# Patient Record
Sex: FEMALE | Race: WHITE | NOT HISPANIC OR LATINO | URBAN - METROPOLITAN AREA
[De-identification: names, ages, dates, MRNs, and addresses within clinical notes are randomized per-mention and may not be internally consistent; named-entity substitution may affect disease eponyms.]

---

## 2017-02-15 ENCOUNTER — IMPORTED ENCOUNTER (OUTPATIENT)
Dept: URBAN - METROPOLITAN AREA CLINIC 50 | Facility: CLINIC | Age: 77
End: 2017-02-15

## 2017-02-16 ENCOUNTER — IMPORTED ENCOUNTER (OUTPATIENT)
Dept: URBAN - METROPOLITAN AREA CLINIC 50 | Facility: CLINIC | Age: 77
End: 2017-02-16

## 2017-02-16 NOTE — PATIENT DISCUSSION
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 Call if vision decreases or distortion increases. Recommend regular Amsler checks.  """

## 2017-02-23 ENCOUNTER — IMPORTED ENCOUNTER (OUTPATIENT)
Dept: URBAN - METROPOLITAN AREA CLINIC 50 | Facility: CLINIC | Age: 77
End: 2017-02-23

## 2017-03-09 ENCOUNTER — IMPORTED ENCOUNTER (OUTPATIENT)
Dept: URBAN - METROPOLITAN AREA CLINIC 50 | Facility: CLINIC | Age: 77
End: 2017-03-09

## 2017-05-09 NOTE — PATIENT DISCUSSION
+Family History of AMD Counseling:  I have instructed the patient to eat a healthy diet with fish and green leafy vegetables, not to smoke, to wear sunglasses while outdoors, and take a multivitamin / AREDS 2 formula vitamin to minimize the risk of disease. The patient was advised to call the office if new symptoms of distortion of vision arise. Return for follow-up as scheduled.

## 2017-05-09 NOTE — PATIENT DISCUSSION
POSTERIOR VITREOUS DETACHMENT WITH VITREOUS FLOATERS, OD: NO HOLES OR NO TEARS 360' WITH INDIRECT EXAM, OU. F&amp;F PRECAUTIONS REVIEWED WITH THE PATIENT. RETURN  AS SCHEDULED.

## 2018-04-10 ENCOUNTER — IMPORTED ENCOUNTER (OUTPATIENT)
Dept: URBAN - METROPOLITAN AREA CLINIC 50 | Facility: CLINIC | Age: 78
End: 2018-04-10

## 2018-07-23 NOTE — PATIENT DISCUSSION
OCULAR ROSACEA, OU:  PRESCRIBE GOOD QUALITY ARTIFICIAL TEARS BID-QID. RECOMMEND THE DAILY INTAKE OF OMEGA-3 FATTY ACIDS WITH PRIMARY CARE PHYSICIANS APPROVAL. WILL CONSIDER WARM COMPRESSES,  EYELID SCRUBS QD-BID, AND  TOPICAL STEROID, ORAL TETRACYCLINE AND/OR LIPIFLOW FOR EXACERBATIONS IF SYMPTOMS WORSEN. RETURN FOR FOLLOW-UP AS SCHEDULED.

## 2018-07-23 NOTE — PATIENT DISCUSSION
Continue: Fish Oil (omega 3-dha-epa-fish oil): capsule: 60- mg I personally performed the service described in the documentation recorded by the scribe in my presence, and it accurately and completely records my words and actions.

## 2018-11-16 NOTE — PATIENT DISCUSSION
Burrell Visual Field 36 point screen: I have reviewed the visual fields both taped and untaped on this patient which demonstrate significant obstruction of the patient's peripheral visual field on both eyes.

## 2018-11-16 NOTE — PATIENT DISCUSSION
PHOTOGRAPHS: I have reviewed the external ocular photographs of this patient which show the following: significant dermatochalasis and ptosis of both upper eyelids.

## 2019-06-19 NOTE — PATIENT DISCUSSION
PHOTOGRAPHS: I have reviewed the external ocular photographs of this patient which show the following: significant ptosis and dermatochalasis of both upper eyelids.

## 2019-06-25 ENCOUNTER — IMPORTED ENCOUNTER (OUTPATIENT)
Dept: URBAN - METROPOLITAN AREA CLINIC 50 | Facility: CLINIC | Age: 79
End: 2019-06-25

## 2019-06-25 NOTE — PATIENT DISCUSSION
"""Long discussion on dry eye. Explained to patient that dry eye is not due to cataract surgery.  ""

## 2019-07-25 NOTE — PATIENT DISCUSSION
*CATARACTS, OU - SLOWLY BECOMING VISUALLY SIGNIFICANT BUT NOT BOTHERSOME TO PATIENT AT THIS TIME. SPOKE IN GREAT DETAIL WITH PATIENT ABOUT CATARACT SURGERY. SPEC RX OFFERED. FOLLOW AS SCHEDULED.

## 2019-07-25 NOTE — PATIENT DISCUSSION
DERMATOCHALASIS / PTOSIS RUL AND MARY :  VISUALLY SIGNIFICANT TO PATIENT. SCHEDULE WITH OCULOPLASTIC SPECIALIST IF PATIENT DESIRES.

## 2019-08-09 NOTE — PATIENT DISCUSSION
Also, please do not hesitate to call us if you have any concerns not addressed by this information. Please call [phone] and we will do everything we can to help you during this period.

## 2020-06-30 ENCOUNTER — IMPORTED ENCOUNTER (OUTPATIENT)
Dept: URBAN - METROPOLITAN AREA CLINIC 50 | Facility: CLINIC | Age: 80
End: 2020-06-30

## 2020-06-30 NOTE — PATIENT DISCUSSION
"""Discussion on dry eye and the importance of lubricating.  Explained that irritation is secondary to ""

## 2020-10-29 NOTE — PATIENT DISCUSSION
CATARACT, OU - VISUALLY SIGNIFICANT. DISCUSSED PROCEDURE IN DETAIL. SCHEDULE PHACO WITH IOL OS FIRST THEN OD IF VISUAL SYMPTOMS PERSIST. GLASSES RX GIVEN TO FILL IF DESIRES IN THE EVENT PATIENT DOES NOT PROCEED WITH SURGERY.

## 2020-10-29 NOTE — PATIENT DISCUSSION
Surgery Counseling:  I have discussed the option of glasses versus cataract surgery versus following, It was explained that when vision no longer meets the patient's visual needs and a new prescription for glasses is not likely to improve the patient's visual symptoms, the option of cataract surgery is a reasonable next step. It was explained that there is no guarantee that removing the cataract will improve their visual symptoms; however, it is believed that the cataract is contributing to the patient's visual impairment and surgery may noticeably improve both the visual and functional status of the patient. After this discussion, the patient desires to proceed with cataract surgery with implantation of an intraocular lens to improve their vision for driving, watching TV, and reading.

## 2020-10-29 NOTE — PATIENT DISCUSSION
OCULAR ROSACEA, OU:  CONTINUE WARM COMPRESSES,  EYELID SCRUBS QD-BID, AND GOOD QUALITY ARTIFICIAL TEARS BID-QID. RECOMMEND THE DAILY INTAKE OF OMEGA-3 FATTY ACIDS WITH PRIMARY CARE PHYSICIANS APPROVAL. WILL CONSIDER TOPICAL STEROID, ORAL TETRACYCLINE AND/OR LIPIFLOW FOR EXACERBATIONS IF SYMPTOMS WORSEN. RETURN FOR FOLLOW-UP AS SCHEDULED.

## 2020-11-17 NOTE — PATIENT DISCUSSION
New Prescription: moxifloxacin (moxifloxacin): drops: 0.5% 1 drop three times a day as directed into left eye 11-

## 2020-11-17 NOTE — PATIENT DISCUSSION
New Prescription: Pred Forte (prednisolone acetate): drops,suspension: 1% 1 drop three times a day as directed into left eye 11-

## 2020-12-02 NOTE — PATIENT DISCUSSION
S/P PC IOL, OS. STABLE. CONTINUE DROPS AS DIRECTED. RETURN FOR FOLLOW-UP AS SCHEDULED WITH DR. FUNES.

## 2020-12-02 NOTE — PATIENT DISCUSSION
Continue: Pred Forte (prednisolone acetate): drops,suspension: 1% 1 drop three times a day as directed into left eye 11-

## 2020-12-02 NOTE — PATIENT DISCUSSION
Continue: moxifloxacin (moxifloxacin): drops: 0.5% 1 drop three times a day as directed into left eye 11-

## 2020-12-07 NOTE — PATIENT DISCUSSION
Surgery 2nd Eye Counseling: The patient has noticed an improvement in their visual symptoms in the operative eye. The patient complains of decreased vision in the fellow eye when watching TV &amp; driving. It was explained to the patient that the decision to proceed with cataract surgery in the fellow eye is entirely a separate decision from the surgical eye. All of the same risks, benefits and alternatives ere reviewed with the patient again. The patient does feel the vision in the non-operative eye is limiting their daily activities and elects to proceed with surgery in the cataract eye.

## 2020-12-07 NOTE — PATIENT DISCUSSION
Continue: Prolensa (bromfenac): drops: 0.07% 1 drop every morning as directed into both eyes 11- Patient called back. Please call patient at work before 5:00pm.

## 2020-12-07 NOTE — PATIENT DISCUSSION
Continue: Pred Forte (prednisolone acetate): drops,suspension: 1% 1 drop three times a day as directed into both eyes 11-

## 2020-12-07 NOTE — PATIENT DISCUSSION
**MILD TO MODERATE DRY EYE, OS: PRESCRIBED ARTIFICIAL TEARS 2-3 X A DAY OU. RETURN FOR FOLLOW-UP AS SCHEDULED OR SOONER IF SYMPTOMS WORSEN.

## 2020-12-07 NOTE — PATIENT DISCUSSION
Taper as directed for 3 more weeks in the left eye. Start immediately after surgery in the right eye.

## 2020-12-07 NOTE — PATIENT DISCUSSION
Continue as directed in the left eye for 3 more weeks. Start 2 days prior to surgery in the right eye.

## 2020-12-07 NOTE — PATIENT DISCUSSION
Continue: moxifloxacin (moxifloxacin): drops: 0.5% 1 drop three times a day as directed into both eyes 11-

## 2020-12-16 NOTE — PATIENT DISCUSSION
Continue: moxifloxacin (moxifloxacin): drops: 0.5% 1 drop three times a day as directed into right eye 11-

## 2020-12-29 NOTE — PATIENT DISCUSSION
S/P PC IOL, OU: DOING WELL. CONTINUE DROPS AS DIRECTED. SPECS RX OFFERED. RX ARC IN SPECS TO MINIMIZE GLARE. RECOMMENDED ATS BID/PRN OU. RETURN FOR FOLLOW-UP AS SCHEDULED.

## 2021-04-16 ASSESSMENT — VISUAL ACUITY
OS_SC: 20/25
OD_OTHER: 20/30. 20/50.
OS_BAT: 20/50
OS_SC: 20/40+-
OS_SC: 20/30-1
OD_SC: 20/30-
OS_CC: J1+
OD_CC: J1+NEAR
OS_CC: J1+@ 14 IN
OD_CC: J1+@ 14 IN
OD_BAT: 20/30
OD_SC: 20/25=
OS_SC: 20/30
OS_OTHER: 20/50. 20/60.
OD_SC: 20/25-1
OD_SC: 20/25-
OS_CC: J1+NEAR
OS_SC: 20/40
OD_CC: J1+
OS_SC: 20/30++
OD_SC: 20/20
OD_SC: 20/25-

## 2021-04-16 ASSESSMENT — TONOMETRY
OS_IOP_MMHG: 11
OD_IOP_MMHG: 13
OD_IOP_MMHG: 11
OD_IOP_MMHG: 10
OS_IOP_MMHG: 14
OS_IOP_MMHG: 10
OD_IOP_MMHG: 14
OS_IOP_MMHG: 11
OD_IOP_MMHG: 10
OS_IOP_MMHG: 11
OD_IOP_MMHG: 11
OS_IOP_MMHG: 11

## 2021-06-23 ENCOUNTER — PREPPED CHART (OUTPATIENT)
Dept: URBAN - METROPOLITAN AREA CLINIC 52 | Facility: CLINIC | Age: 81
End: 2021-06-23

## 2021-06-29 ENCOUNTER — COMPREHENSIVE EXAM (OUTPATIENT)
Dept: URBAN - METROPOLITAN AREA CLINIC 52 | Facility: CLINIC | Age: 81
End: 2021-06-29

## 2021-06-29 DIAGNOSIS — H35.373: ICD-10-CM

## 2021-06-29 DIAGNOSIS — H43.813: ICD-10-CM

## 2021-06-29 DIAGNOSIS — H16.223: ICD-10-CM

## 2021-06-29 PROCEDURE — 92014 COMPRE OPH EXAM EST PT 1/>: CPT

## 2021-06-29 PROCEDURE — 92134 CPTRZ OPH DX IMG PST SGM RTA: CPT

## 2021-06-29 ASSESSMENT — TONOMETRY
OD_IOP_MMHG: 12
OS_IOP_MMHG: 11

## 2021-06-29 ASSESSMENT — VISUAL ACUITY
OS_SC: 20/25
OD_SC: 20/25-2
OU_SC: J1

## 2021-06-29 NOTE — PATIENT DISCUSSION
Discussion on dry eye. Explained that dry eye can cause blurred/fluctuating VA. Discussion on starting Restasis vs monitoring. Patent elects to monitor at this time. Okay to call in Restasis if worsening.

## 2022-04-06 NOTE — PATIENT DISCUSSION
*Cataract Counseling: The patient's cataracts are becoming visually significant. Not time to proceed with surgery now. I have discussed continuing with current spectacles versus updating. I have offered the patient a new spectacle prescription to fill if desires. Return to follow up as scheduled or sooner if symptoms arise. Finasteride Pregnancy And Lactation Text: This medication is absolutely contraindicated during pregnancy. It is unknown if it is excreted in breast milk.

## 2022-08-03 ENCOUNTER — COMPREHENSIVE EXAM (OUTPATIENT)
Dept: URBAN - METROPOLITAN AREA CLINIC 53 | Facility: CLINIC | Age: 82
End: 2022-08-03

## 2022-08-03 DIAGNOSIS — H35.373: ICD-10-CM

## 2022-08-03 DIAGNOSIS — H43.813: ICD-10-CM

## 2022-08-03 DIAGNOSIS — H16.223: ICD-10-CM

## 2022-08-03 PROCEDURE — 92014 COMPRE OPH EXAM EST PT 1/>: CPT

## 2022-08-03 PROCEDURE — 92134 CPTRZ OPH DX IMG PST SGM RTA: CPT

## 2022-08-03 ASSESSMENT — TONOMETRY
OS_IOP_MMHG: 12
OD_IOP_MMHG: 12

## 2022-08-03 ASSESSMENT — VISUAL ACUITY
OD_SC: 20/25-1
OS_SC: 20/25
OU_SC: J1

## 2023-08-14 ENCOUNTER — COMPREHENSIVE EXAM (OUTPATIENT)
Dept: URBAN - METROPOLITAN AREA CLINIC 52 | Facility: CLINIC | Age: 83
End: 2023-08-14

## 2023-08-14 DIAGNOSIS — H16.223: ICD-10-CM

## 2023-08-14 DIAGNOSIS — H02.831: ICD-10-CM

## 2023-08-14 DIAGNOSIS — H52.4: ICD-10-CM

## 2023-08-14 DIAGNOSIS — H02.834: ICD-10-CM

## 2023-08-14 DIAGNOSIS — H35.373: ICD-10-CM

## 2023-08-14 DIAGNOSIS — H43.813: ICD-10-CM

## 2023-08-14 PROCEDURE — 92134 CPTRZ OPH DX IMG PST SGM RTA: CPT

## 2023-08-14 PROCEDURE — 99214 OFFICE O/P EST MOD 30 MIN: CPT

## 2023-08-14 ASSESSMENT — KERATOMETRY
OD_K1POWER_DIOPTERS: 43.50
OS_AXISANGLE2_DEGREES: 167
OD_AXISANGLE2_DEGREES: 108
OS_K2POWER_DIOPTERS: 44.00
OS_K1POWER_DIOPTERS: 43.75
OS_AXISANGLE_DEGREES: 077
OD_AXISANGLE_DEGREES: 018
OD_K2POWER_DIOPTERS: 43.75

## 2023-08-14 ASSESSMENT — TONOMETRY
OD_IOP_MMHG: 13
OS_IOP_MMHG: 14

## 2023-08-14 ASSESSMENT — VISUAL ACUITY
OD_SC: 20/30
OU_SC: 20/25-1
OU_SC: 20/25"16IN
OD_PH: 20/20
OS_SC: 20/25-1

## 2023-11-01 NOTE — PATIENT DISCUSSION
Patient discharged to home with daughter. Patient left with all personal belongings including cell phone, clothing, glasses, and cane. Patient left with discharge material and medications. Patient and daughter provided with education regarding medications, wound care, and follow up appointments. Patient to follow up with Marlene at home for wound care. Patient and daughter had all questions answered, verbalized understanding.    With best regards,

## 2024-08-14 ENCOUNTER — COMPREHENSIVE EXAM (OUTPATIENT)
Dept: URBAN - METROPOLITAN AREA CLINIC 49 | Facility: LOCATION | Age: 84
End: 2024-08-14

## 2024-08-14 DIAGNOSIS — H11.823: ICD-10-CM

## 2024-08-14 DIAGNOSIS — H43.813: ICD-10-CM

## 2024-08-14 DIAGNOSIS — H10.13: ICD-10-CM

## 2024-08-14 DIAGNOSIS — H18.413: ICD-10-CM

## 2024-08-14 DIAGNOSIS — H35.373: ICD-10-CM

## 2024-08-14 DIAGNOSIS — H16.223: ICD-10-CM

## 2024-08-14 PROCEDURE — 99214 OFFICE O/P EST MOD 30 MIN: CPT

## 2024-08-14 PROCEDURE — 92134 CPTRZ OPH DX IMG PST SGM RTA: CPT

## 2024-08-14 RX ORDER — TOBRAMYCIN AND DEXAMETHASONE 1; 3 MG/ML; MG/ML
1 SUSPENSION/ DROPS OPHTHALMIC
Start: 2024-08-14

## 2024-08-14 ASSESSMENT — KERATOMETRY
OD_K2POWER_DIOPTERS: 43.75
OS_K1POWER_DIOPTERS: 43.75
OS_AXISANGLE2_DEGREES: 167
OD_K1POWER_DIOPTERS: 43.50
OD_AXISANGLE2_DEGREES: 108
OS_K2POWER_DIOPTERS: 44.00
OS_AXISANGLE_DEGREES: 077
OD_AXISANGLE_DEGREES: 018

## 2024-08-14 ASSESSMENT — VISUAL ACUITY
OD_SC: 20/20-1
OS_SC: 20/20

## 2024-08-14 ASSESSMENT — TONOMETRY
OS_IOP_MMHG: 15
OD_IOP_MMHG: 14

## 2025-01-20 ENCOUNTER — EMERGENCY VISIT (OUTPATIENT)
Age: 85
End: 2025-01-20

## 2025-01-20 DIAGNOSIS — H11.823: ICD-10-CM

## 2025-01-20 DIAGNOSIS — H02.831: ICD-10-CM

## 2025-01-20 DIAGNOSIS — H02.834: ICD-10-CM

## 2025-01-20 DIAGNOSIS — H10.13: ICD-10-CM

## 2025-01-20 DIAGNOSIS — H16.223: ICD-10-CM

## 2025-01-20 DIAGNOSIS — H18.413: ICD-10-CM

## 2025-01-20 PROCEDURE — 99213 OFFICE O/P EST LOW 20 MIN: CPT

## 2025-01-20 RX ORDER — NEOMYCIN SULFATE, POLYMYXIN B SULFATE AND DEXAMETHASONE 3.5; 10000; 1 MG/ML; [USP'U]/ML; MG/ML
1 SUSPENSION OPHTHALMIC
Start: 2025-01-20

## 2025-01-20 RX ORDER — ERYTHROMYCIN 5 MG/G
1 OINTMENT OPHTHALMIC EVERY EVENING
Start: 2025-01-20